# Patient Record
Sex: FEMALE | Race: WHITE
[De-identification: names, ages, dates, MRNs, and addresses within clinical notes are randomized per-mention and may not be internally consistent; named-entity substitution may affect disease eponyms.]

---

## 2018-05-15 ENCOUNTER — HOSPITAL ENCOUNTER (OUTPATIENT)
Dept: HOSPITAL 5 - GIO | Age: 33
Discharge: HOME | End: 2018-05-15
Attending: SPECIALIST
Payer: MEDICAID

## 2018-05-15 VITALS — SYSTOLIC BLOOD PRESSURE: 118 MMHG | DIASTOLIC BLOOD PRESSURE: 78 MMHG

## 2018-05-15 DIAGNOSIS — K21.9: ICD-10-CM

## 2018-05-15 DIAGNOSIS — K44.9: Primary | ICD-10-CM

## 2018-05-15 DIAGNOSIS — G47.33: ICD-10-CM

## 2018-05-15 DIAGNOSIS — Z99.89: ICD-10-CM

## 2018-05-15 DIAGNOSIS — E03.9: ICD-10-CM

## 2018-05-15 DIAGNOSIS — E66.01: ICD-10-CM

## 2018-05-15 PROCEDURE — 81025 URINE PREGNANCY TEST: CPT

## 2018-05-15 PROCEDURE — 43235 EGD DIAGNOSTIC BRUSH WASH: CPT

## 2018-05-15 NOTE — ANESTHESIA CONSULTATION
Anesthesia Consult and Med Hx


Date of service: 05/15/18





- Airway


Anesthetic Teeth Evaluation: Good


ROM Head & Neck: Adequate


Mental/Hyoid Distance: Adequate


Mallampati Class: Class II


Intubation Access Assessment: Probably Good





- Pulmonary Exam


CTA: Yes





- Cardiac Exam


Cardiac Exam: RRR





- Pre-Operative Health Status


ASA Pre-Surgery Classification: ASA3


Proposed Anesthetic Plan: MAC





- Pulmonary


SOB: Yes


Hx Sleep Apnea: Yes (uses cpap)





- Central Nervous System


Hx Back Pain: Yes





- Endocrine


Hx Hypothyroidism: Yes





- Other Systems


Hx Obesity: Yes